# Patient Record
Sex: FEMALE | Race: WHITE | HISPANIC OR LATINO | URBAN - METROPOLITAN AREA
[De-identification: names, ages, dates, MRNs, and addresses within clinical notes are randomized per-mention and may not be internally consistent; named-entity substitution may affect disease eponyms.]

---

## 2022-01-28 PROCEDURE — U0004 COV-19 TEST NON-CDC HGH THRU: HCPCS | Performed by: NURSE PRACTITIONER

## 2023-07-29 DIAGNOSIS — F32.2 SEVERE DEPRESSION: ICD-10-CM

## 2023-07-29 DIAGNOSIS — Z72.0 TOBACCO ABUSE: ICD-10-CM

## 2023-07-31 RX ORDER — BUPROPION HYDROCHLORIDE 100 MG/1
TABLET, EXTENDED RELEASE ORAL
Qty: 60 TABLET | Refills: 3 | Status: SHIPPED | OUTPATIENT
Start: 2023-07-31

## 2023-08-28 DIAGNOSIS — L70.9 ACNE, UNSPECIFIED ACNE TYPE: ICD-10-CM

## 2023-08-28 RX ORDER — DOXYCYCLINE HYCLATE 50 MG/1
50 CAPSULE ORAL 2 TIMES DAILY
Qty: 60 CAPSULE | Refills: 0 | OUTPATIENT
Start: 2023-08-28

## 2023-08-29 RX ORDER — DOXYCYCLINE HYCLATE 50 MG/1
50 CAPSULE ORAL 2 TIMES DAILY
Qty: 60 CAPSULE | Refills: 0 | Status: SHIPPED | OUTPATIENT
Start: 2023-08-29

## 2023-08-29 NOTE — TELEPHONE ENCOUNTER
Caller: Jaskaran Dgina    Relationship: Self    Best call back number: [phone]    Requested Prescriptions:   Requested Prescriptions     Refused Prescriptions Disp Refills    doxycycline (VIBRAMYCIN) 50 MG capsule 60 capsule 0     Sig: Take 1 capsule by mouth 2 (Two) Times a Day.     Refused By: RICARDO JAMES     Reason for Refusal: Patient needs an appointment        Pharmacy where request should be sent: [facility] [id number] [address] [phone] PH - [phone]      Last office visit with prescribing clinician: 7/3/2023   Last telemedicine visit with prescribing clinician: Visit date not found   Next office visit with prescribing clinician: 8/28/2023     Additional details provided by patient: PATIENT IS OUT OF THE MEDICATION     Does the patient have less than a 3 day supply:  [x] Yes  [] No    Would you like a call back once the refill request has been completed: [x] Yes [] No    If the office needs to give you a call back, can they leave a voicemail: [x] Yes [] No    Jordana Salmon Rep   08/29/23 12:49 EDT

## 2023-09-19 ENCOUNTER — OFFICE VISIT (OUTPATIENT)
Dept: INTERNAL MEDICINE | Facility: CLINIC | Age: 33
End: 2023-09-19
Payer: MEDICAID

## 2023-09-19 VITALS
HEART RATE: 72 BPM | HEIGHT: 67 IN | BODY MASS INDEX: 43.98 KG/M2 | WEIGHT: 280.2 LBS | DIASTOLIC BLOOD PRESSURE: 88 MMHG | SYSTOLIC BLOOD PRESSURE: 118 MMHG | TEMPERATURE: 97.8 F | RESPIRATION RATE: 18 BRPM

## 2023-09-19 DIAGNOSIS — Z00.00 ENCOUNTER FOR WELLNESS EXAMINATION: Primary | ICD-10-CM

## 2023-09-19 DIAGNOSIS — J45.21 MILD INTERMITTENT ASTHMA WITH ACUTE EXACERBATION: ICD-10-CM

## 2023-09-19 DIAGNOSIS — Z13.6 ENCOUNTER FOR LIPID SCREENING FOR CARDIOVASCULAR DISEASE: ICD-10-CM

## 2023-09-19 DIAGNOSIS — F43.10 PTSD (POST-TRAUMATIC STRESS DISORDER): ICD-10-CM

## 2023-09-19 DIAGNOSIS — Z72.53 HIGH RISK BISEXUAL BEHAVIOR: ICD-10-CM

## 2023-09-19 DIAGNOSIS — N92.1 MENORRHAGIA WITH IRREGULAR CYCLE: ICD-10-CM

## 2023-09-19 DIAGNOSIS — Z13.220 ENCOUNTER FOR LIPID SCREENING FOR CARDIOVASCULAR DISEASE: ICD-10-CM

## 2023-09-19 DIAGNOSIS — F60.3 BORDERLINE PERSONALITY DISORDER: ICD-10-CM

## 2023-09-19 DIAGNOSIS — Z23 ENCOUNTER FOR IMMUNIZATION: ICD-10-CM

## 2023-09-19 DIAGNOSIS — F33.2 SEVERE EPISODE OF RECURRENT MAJOR DEPRESSIVE DISORDER, WITHOUT PSYCHOTIC FEATURES: ICD-10-CM

## 2023-09-19 DIAGNOSIS — L70.9 ACNE, UNSPECIFIED ACNE TYPE: ICD-10-CM

## 2023-09-19 DIAGNOSIS — L40.9 PSORIASIS: ICD-10-CM

## 2023-09-19 RX ORDER — DOXYCYCLINE HYCLATE 50 MG/1
50 CAPSULE ORAL DAILY
Qty: 30 CAPSULE | Refills: 0 | Status: SHIPPED | OUTPATIENT
Start: 2023-09-19

## 2023-09-19 RX ORDER — ALBUTEROL SULFATE 90 UG/1
2 AEROSOL, METERED RESPIRATORY (INHALATION) EVERY 4 HOURS PRN
Qty: 18 G | Refills: 0 | Status: SHIPPED | OUTPATIENT
Start: 2023-09-19

## 2023-09-19 RX ORDER — TRIAMCINOLONE ACETONIDE 1 MG/G
1 CREAM TOPICAL 2 TIMES DAILY
Qty: 15 G | Refills: 1 | Status: SHIPPED | OUTPATIENT
Start: 2023-09-19

## 2023-09-19 NOTE — LETTER
[facility]  Vaccine Consent Form    Patient Name:  Digna Jessica  Patient :  1990     Vaccine(s) Ordered    Tdap Vaccine Greater Than or Equal To 8yo IM        Screening Checklist  The following questions should be completed prior to vaccination. If you answer “yes” to any question, it does not necessarily mean you should not be vaccinated. It just means we may need to clarify or ask more questions. If a question is unclear, please ask your healthcare provider to explain it.    Yes No   Any fever or moderate to severe illness today (mild illness and/or antibiotic treatment are not contraindications)?     Do you have a history of a serious reaction to any previous vaccinations, such as anaphylaxis, encephalopathy within 7 days, Guillain-Eldridge syndrome within 6 weeks, seizure?     Have you received any live vaccine(s) in the past month (MMR, YASMINE)?     Do you have an anaphylactic allergy to latex (DTaP, DTaP-IPV, Hep A, Hep B, MenB, RV, Td, Tdap), baker’s yeast (Hep B, HPV), or gelatin (YASMINE, MMR)?     Do you have an anaphylactic allergy to neomycin (Rabies, YASMINE, MMR, IPV, Hep A), polymyxin B (IPV), or streptomycin (IPV)?      Any cancer, leukemia, AIDS, or other immune system disorder? (YASMINE, MMR, RV)     Do you have a parent, brother, or sister with an immune system problem (if immune competence of vaccine recipient clinically verified, can proceed)? (MMR, YASMINE)     Any recent steroid treatments for >2 weeks, chemotherapy, or radiation treatment? (YASMINE, MMR)     Have you received antibody-containing blood transfusions or IVIG in the past 11 months (recommended interval is dependent on product)? (MMR, YASMINE)     Have you taken antiviral drugs (acyclovir, famciclovir, valacyclovir) in the last 24 or 48 hours, respectively (YASMINE)?      Are you pregnant or planning to become pregnant within 1 month? (YASMINE, MMR, HPV, IPV, MenB; For hep B- refer to Engerix-B)     For infants, have you ever been told your child has had  intussusception or a medical emergency involving obstruction of the intestine (RV)? If not for an infant, can skip this question.         *Ordering Physician/APC should be consulted if “yes” is checked by the patient or guardian above.      I have received, read, and understand the Vaccine Information Statement (VIS) for each vaccine ordered above.  I have considered my health status as well as the health status of my close contacts.  I have taken the opportunity to discuss my vaccine questions with my health care provider.   I have requested that the ordered vaccine(s) be given to me.  I understand the benefits and risks of the vaccines.  I understand that I should remain in the clinic for 15 minutes after receiving the vaccine(s).  _________________________________________________________  Signature of Patient or Parent/Legal Guardian ____________________  Date

## 2023-09-19 NOTE — PATIENT INSTRUCTIONS
MyPlate from USDA  MyPlate is an outline of a general healthy diet based on the Dietary Guidelines for Americans, 7106-5263, from the U.[place]. Department of Agriculture (USDA). It sets guidelines for how much food you should eat from each food group based on your age, sex, and level of physical activity.  What are tips for following MyPlate?  To follow MyPlate recommendations:  Eat a wide variety of fruits and vegetables, grains, and protein foods.  Serve smaller portions and eat less food throughout the day.  Limit portion sizes to avoid overeating.  Enjoy your food.  Get at least 150 minutes of exercise every week. This is about 30 minutes each day, 5 or more days per week.  It can be difficult to have every meal look like MyPlate. Think about MyPlate as eating guidelines for an entire day, rather than each individual meal.  Fruits and vegetables  Make one half of your plate fruits and vegetables.  Eat many different colors of fruits and vegetables each day.  For a 2,000-calorie daily food plan, eat:  2½ cups of vegetables every day.  2 cups of fruit every day.  1 cup is equal to:  1 cup raw or cooked vegetables.  1 cup raw fruit.  1 medium-sized orange, apple, or banana.  1 cup 100% fruit or vegetable juice.  2 cups raw leafy greens, such as lettuce, spinach, or kale.  ½ cup dried fruit.  Grains  One fourth of your plate should be grains.  Make at least half of the grains you eat each day whole grains.  For a 2,000-calorie daily food plan, eat 6 oz of grains every day.  1 oz is equal to:  1 slice bread.  1 cup cereal.  ½ cup cooked rice, cereal, or pasta.  Protein  One fourth of your plate should be protein.  Eat a wide variety of protein foods, including meat, poultry, fish, eggs, beans, nuts, and tofu.  For a 2,000-calorie daily food plan, eat 5½ oz of protein every day.  1 oz is equal to:  1 oz meat, poultry, or fish.  ¼ cup cooked beans.  1 egg.  ½ oz nuts or seeds.  1 Tbsp peanut butter.  Dairy  Drink fat-free  or low-fat (1%) milk.  Eat or drink dairy as a side to meals.  For a 2,000-calorie daily food plan, eat or drink 3 cups of dairy every day.  1 cup is equal to:  1 cup milk, yogurt, cottage cheese, or soy milk (soy beverage).  2 oz processed cheese.  1½ oz natural cheese.  Fats, oils, salt, and sugars  Only small amounts of oils are recommended.  Avoid foods that are high in calories and low in nutritional value (empty calories), like foods high in fat or added sugars.  Choose foods that are low in salt (sodium). Choose foods that have less than 140 milligrams (mg) of sodium per serving.  Drink water instead of sugary drinks. Drink enough fluid to keep your urine pale yellow.  Where to find support  Work with your health care provider or a dietitian to develop a customized eating plan that is right for you.  Download an david (mobile application) to help you track your daily food intake.  Where to find more information  USDA: ChooseMyPlate.gov  Summary  MyPlate is a general guideline for healthy eating from the USDA. It is based on the Dietary Guidelines for Americans, 3403-9493.  In general, fruits and vegetables should take up one half of your plate, grains should take up one fourth of your plate, and protein should take up one fourth of your plate.  This information is not intended to replace advice given to you by your health care provider. Make sure you discuss any questions you have with your health care provider.  Document Revised: 11/08/2021 Document Reviewed: 11/08/2021  [employer] Patient Education © 2022 [employer]

## 2023-09-19 NOTE — PROGRESS NOTES
Female Physical Note      Patient Name: Digna Jessica  : 1990   MRN: [id number]     Chief Complaint:    Chief Complaint   Patient presents with    Annual Exam     And PAP       History of Present Illness: Digna Jessica is a 32 y.o. adult who is here today for their annual health maintenance and physical.    The patient presents today for an annual physical examination. They are accompanied by a friend, Christina Nieves.    At the patient's last visit, their menstrual cycle was discussed. The patient denies following up with any other providers currently. They are interested in referral to gynecology for evaluation and Pap smear. The patient denies dysuria, vaginal discharge, and change in bowel habits. They note menorrhagia and dysmenorrhea. Their menses has been irregular recently though was regular prior to 2023. They had menses in early 2023 and late 2023 and none since. The patient has been informed they may have endometriosis though has not yet undergone ultrasound. They do not report a diagnosis of polycystic ovary syndrome. They inquire about breast examination and deny current breast issues.    The patient sometimes feels that Wellbutrin 100 mg twice daily is not adequate to treat depression. Additionally, they deny noticing any weight loss benefit with Wellbutrin. In youth, they tried Abilify and Lexapro. As an adult, they tried Wellbutrin and Celexa. The patient's depression is severe and recurrent. They deny being diagnosed with bipolar disorder. They report being diagnosed with borderline personality disorder and posttraumatic stress disorder.    The patient has psoriasis  which affects their bilateral elbows, bilateral knees, scalp, and occasionally bilateral ears. They recall undergoing ear irrigation in childhood. They are utilizing triamcinolone ointment; however, they prefer triamcinolone cream. The patient was previously referred to [facility] in [place] and believes their  "appointment has been scheduled. Chart review indicates [facility] may have encountered difficulty contacting the patient.    They request sexually-transmitted infection testing, including blood work, which the patient has not undergone in a lengthy period of time. The patient reports male and female sexual partners.    The patient experiences frequent headaches and denies trialing prescription medication for headaches. They have tried naproxen and ibuprofen without relief.     They competed the previously prescribed doxycycline twice daily, which was significantly improved acne. They are currently \"breaking out\" (redness) due to sun exposure.    The patient notes mild fatigue.    The patient is fasting today and is uncertain if their vitamin D level has ever been low.     They smoke and are interested in tobacco cessation. Wellbutrin was helpful initially, and they have reduced their smoking. They decline nicotine patches.    The patient has a history of asthma, which is occasionally exacerbated by seasonal changes. They utilize an albuterol inhaler.    They exercise at home with an david and utilize weights and a yoga ball.    The patient is due for pneumonia and tetanus vaccinations.    They recently tried to donate plasma, psoriasis was noticed, and they were provided with paperwork that must be completed/faxed by primary care regarding medications and doses.    Subjective     Review of System: Review of Systems   Constitutional:  Positive for fatigue.   Skin:         Acne; psoriasis     Psychiatric/Behavioral:  Positive for dysphoric mood.     I have reviewed the ROS documented by my clinical staff, updated appropriately and I agree. [facility] Alfredo      Past Medical History:   Past Medical History:   Diagnosis Date    ADHD (attention deficit hyperactivity disorder) 1990    Allergic 1990    Anxiety 2010    Arthritis     Asthma 1990    Depression     Obesity 2010       Past Surgical History:   Past " Surgical History:   Procedure Laterality Date    CHOLECYSTECTOMY      CYST REMOVAL         Family History:   Family History   Problem Relation Age of Onset    Anxiety disorder Mother     Depression Mother     Diabetes Mother     Heart disease Mother     Mental illness Mother     Anxiety disorder Brother        Social History:   Social History     Socioeconomic History    Marital status: Single   Tobacco Use    Smoking status: Every Day     Packs/day: 0.10     Years: 10.00     Pack years: 1.00     Types: Cigarettes    Smokeless tobacco: Former   Vaping Use    Vaping Use: Never used   Substance and Sexual Activity    Alcohol use: Yes     Alcohol/week: 9.0 standard drinks     Types: 3 Cans of beer, 3 Shots of liquor, 3 Drinks containing 0.5 oz of alcohol per week    Drug use: No    Sexual activity: Yes     Partners: Female, Male     Birth control/protection: Same-sex partner       Medications:     Current Outpatient Medications:     buPROPion SR (WELLBUTRIN SR) 100 MG 12 hr tablet, TAKE 1 TABLET BY MOUTH TWICE A DAY, Disp: 60 tablet, Rfl: 3    albuterol sulfate  (90 Base) MCG/ACT inhaler, Inhale 2 puffs Every 4 (Four) Hours As Needed for Wheezing., Disp: 18 g, Rfl: 0    doxycycline (VIBRAMYCIN) 50 MG capsule, Take 1 capsule by mouth Daily., Disp: 30 capsule, Rfl: 0    triamcinolone (KENALOG) 0.1 % cream, Apply 1 application  topically to the appropriate area as directed 2 (Two) Times a Day., Disp: 15 g, Rfl: 1    Allergies:   Allergies   Allergen Reactions    Codeine GI Intolerance    Sulfa Antibiotics GI Intolerance       Immunizations:  “Discussed risks/benefits to vaccination, reviewed components of the vaccine, discussed VIS, discussed informed consent, informed consent obtained. Patient/Parent was allowed to accept or refuse vaccine. Questions answered to satisfactory state of patient/Parent. We reviewed typical age appropriate and seasonally appropriate vaccinations. Reviewed immunization history and  "updated state vaccination form as needed. Patient was counseled on COVID-19  Prevnar 20  Tdap   Hep C: Screen per guidelines     Colorectal Screening:     Last Completed Colonoscopy       This patient has no relevant Health Maintenance data.          Pap:    Last Completed Pap Smear       This patient has no relevant Health Maintenance data.          Mammogram:    Last Completed Mammogram       This patient has no relevant Health Maintenance data.             Objective     Physical Exam:  Vital Signs:   Vitals:    09/19/23 1123   BP: 118/88   BP Location: Right arm   Patient Position: Sitting   Cuff Size: Large Adult   Pulse: 72   Resp: 18   Temp: 97.8 °F (36.6 °C)   TempSrc: Infrared   Weight: 127 kg (280 lb 3.2 oz)   Height: 168.9 cm (66.5\")   PainSc: 0-No pain     Body mass index is 44.55 kg/m².        Physical Exam  Vitals and nursing note reviewed.   Constitutional:       General: Digna Jessica is not in acute distress.     Appearance: Normal appearance. Digna Jessica is not ill-appearing.   HENT:      Head: Normocephalic.      Right Ear: Tympanic membrane, ear canal and external ear normal. There is no impacted cerumen.      Left Ear: Tympanic membrane, ear canal and external ear normal. There is no impacted cerumen.      Nose: No nasal tenderness or rhinorrhea.      Mouth/Throat:      Mouth: Mucous membranes are moist.      Pharynx: Oropharynx is clear. No oropharyngeal exudate or posterior oropharyngeal erythema.   Eyes:      General:         Right eye: No discharge.         Left eye: No discharge.      Extraocular Movements: Extraocular movements intact.      Conjunctiva/sclera: Conjunctivae normal.      Pupils: Pupils are equal, round, and reactive to light.   Neck:      Thyroid: No thyromegaly.   Cardiovascular:      Rate and Rhythm: Normal rate and regular rhythm.      Pulses: Normal pulses.      Heart sounds: Normal heart sounds. No murmur heard.    No gallop.   Pulmonary:      Effort: Pulmonary effort is " normal.      Breath sounds: Normal breath sounds. No wheezing, rhonchi or rales.   Abdominal:      General: Bowel sounds are normal.      Palpations: Abdomen is soft. There is no mass.      Tenderness: There is no abdominal tenderness. There is no right CVA tenderness or left CVA tenderness.   Musculoskeletal:         General: No swelling or tenderness. Normal range of motion.      Cervical back: Normal range of motion.      Right lower leg: No edema.      Left lower leg: No edema.   Lymphadenopathy:      Cervical: No cervical adenopathy.   Skin:     General: Skin is warm and dry.      Capillary Refill: Capillary refill takes less than 2 seconds.      Findings: No erythema or rash.      Comments: Acne improved; psoriasis on elbows    Neurological:      General: No focal deficit present.      Mental Status: Digna Jessica is alert and oriented to person, place, and time.      Motor: No weakness.   Psychiatric:         Mood and Affect: Mood normal.         Behavior: Behavior is cooperative.         Cognition and Memory: Digna Jessica does not exhibit impaired recent memory.       Procedures    Assessment / Plan      Assessment/Plan:   Diagnoses and all orders for this visit:    1. Encounter for wellness examination (Primary)  -     Comprehensive Metabolic Panel; Future  -     CBC & Differential; Future  -     TSH Rfx On Abnormal To Free T4; Future  -     CBC & Differential  -     Comprehensive Metabolic Panel  -     TSH Rfx On Abnormal To Free T4    2. Severe episode of recurrent major depressive disorder, without psychotic features  -     GeneSight - Swab,; Future    3. Borderline personality disorder  -     GeneSight - Swab,; Future    4. PTSD (post-traumatic stress disorder)  -     GeneSight - Swab,; Future    5. High risk bisexual behavior  -     Hepatitis B Core Antibody, Total; Future  -     Hepatitis B Core Antibody, IgM; Future  -     HIV-1 / O / 2 Ag / Antibody; Future  -     HSV 1 & 2 - Specific Antibody, IgG;  Future  -     RPR; Future  -     Hepatitis C Antibody; Future  -     Cancel: Hepatitis B Surface Antigen; Future  -     Hepatitis B Surface Antibody; Future  -     Hepatitis A Antibody, IgM; Future  -     Cancel: Chlamydia trachomatis, Neisseria gonorrhoeae, Trichomonas vaginalis, PCR - Urine, Urine, Clean Catch; Future  -     Hepatitis B Surface Antigen; Future  -     Hepatitis B Surface Antigen  -     Hepatitis A Antibody, IgM  -     Hepatitis B Surface Antibody  -     Hepatitis C Antibody  -     RPR  -     HSV 1 & 2 - Specific Antibody, IgG  -     HIV-1 / O / 2 Ag / Antibody  -     Hepatitis B Core Antibody, IgM  -     Hepatitis B Core Antibody, Total  -     Cancel: Chlamydia trachomatis, Neisseria gonorrhoeae, Trichomonas vaginalis, PCR - Urine, Urine, Clean Catch  -     Chlamydia trachomatis, Neisseria gonorrhoeae, Trichomonas vaginalis, PCR - Swab, Urine, Clean Catch; Future  -     Chlamydia trachomatis, Neisseria gonorrhoeae, Trichomonas vaginalis, PCR - Urine, Urine, Clean Catch    6. Encounter for lipid screening for cardiovascular disease  -     Lipid Panel; Future  -     Lipid Panel    7. Acne, unspecified acne type  -     doxycycline (VIBRAMYCIN) 50 MG capsule; Take 1 capsule by mouth Daily.  Dispense: 30 capsule; Refill: 0    8. Psoriasis  -     triamcinolone (KENALOG) 0.1 % cream; Apply 1 application  topically to the appropriate area as directed 2 (Two) Times a Day.  Dispense: 15 g; Refill: 1    9. Mild intermittent asthma with acute exacerbation  -     albuterol sulfate  (90 Base) MCG/ACT inhaler; Inhale 2 puffs Every 4 (Four) Hours As Needed for Wheezing.  Dispense: 18 g; Refill: 0    10. Menorrhagia with irregular cycle  -     Ambulatory Referral to Gynecology    11. Encounter for immunization  -     Tdap Vaccine Greater Than or Equal To 6yo IM    1. Annual physical examination  - A panel of laboratory studies will be performed including CMP, CBC, lipid panel, and thyroid function.  -  Sexually-transmitted infection urine and blood testing will be performed for human immunodeficiency virus, syphilis, hepatitis B, chlamydia, gonorrhea, and  herpes simplex virus. They will be contacted with their results.  - Tetanus vaccination will be administered today. Pneumonia vaccination will be administered at next followup.  - Paperwork for plasma donation was completed and will be faxed.    2. Menorrhagia, dysmenorrhea, and irregular menses  - Referral was placed to [facility] for evaluation, Pap smear, and breast examination. Scheduling phone number was provided.  Scheduling will attempt three calls; if unable to reach patient, the orders will be canceled.      3. Severe recurrent depression  - GeneSight testing was ordered, and results are expected in approximately 2 weeks. They will contact the office to schedule a telehealth appointment if they wish to change medication or increase Wellbutrin. Wellbutrin 100 mg twice daily will be continued for the time being.     4. Borderline personality disorder  -Consider referral to .     5. Posttraumatic stress disorder    6. Psoriasis  - Triamcinolone cream was prescribed in place of ointment.  - They will contact [facility] to verify if an appointment is scheduled. If re-referral to dermatology is needed, they will contact the office.     7. Headaches  - Prescription medication for headaches and weight loss was discussed and may be considered in the future after depression control improves. Additional information was provided via [employer].     8. Acne  - Doxycycline is being decreased to once daily for 1 month. They were informed that doxycycline may cause skin hypersensitivity to sunlight and advised to hold doxycycline until current sunburn resolves. Daily sunscreen was recommended. They were recommended to avoid pregnancy while taking doxycycline. The patient was warned that doxycycline may interact with some over-the-counter  vitamins and advised to take these 2 hours apart from doxycycline.  - They will follow up in 1 month for reevaluation of acne and consideration of further doxycycline treatment.    9. Asthma  - Albuterol inhaler was refilled.    10. Tobacco use  - Prescription medications for tobacco cessation may adversely affect mood. This may be revisited after depression control improves.      Follow Up:   Return in about 4 weeks (around 10/17/2023) for Recheck.    Healthcare Maintenance:   Counseling provided on     Health Maintenance, Female  Adopting a healthy lifestyle and getting preventive care can go a long way to promote health and wellness. Talk with your health care provider about what schedule of regular examinations is right for you. This is a good chance for you to check in with your provider about disease prevention and staying healthy.  In between checkups, there are plenty of things you can do on your own. Experts have done a lot of research about which lifestyle changes and preventive measures are most likely to keep you healthy. Ask your health care provider for more information.  Weight and diet  Eat a healthy diet  Be sure to include plenty of vegetables, fruits, low-fat dairy products, and lean protein.  Do not eat a lot of foods high in solid fats, added sugars, or salt.  Get regular exercise. This is one of the most important things you can do for your health.  Most adults should exercise for at least 150 minutes each week. The exercise should increase your heart rate and make you sweat (moderate-intensity exercise).  Most adults should also do strengthening exercises at least twice a week. This is in addition to the moderate-intensity exercise.     Maintain a healthy weight  Body mass index (BMI) is a measurement that can be used to identify possible weight problems. It estimates body fat based on height and weight. Your health care provider can help determine your BMI and help you achieve or maintain a  healthy weight.  For females 20 years of age and older:  A BMI below 18.5 is considered underweight.  A BMI of 18.5 to 24.9 is normal.  A BMI of 25 to 29.9 is considered overweight.  A BMI of 30 and above is considered obese.     Watch levels of cholesterol and blood lipids  You should start having your blood tested for lipids and cholesterol at 20 years of age, then have this test every 5 years.  You may need to have your cholesterol levels checked more often if:  Your lipid or cholesterol levels are high.  You are older than 50 years of age.  You are at high risk for heart disease.     Cancer screening  Lung Cancer  Lung cancer screening is recommended for adults 55-80 years old who are at high risk for lung cancer because of a history of smoking.  A yearly low-dose CT scan of the lungs is recommended for people who:  Currently smoke.  Have quit within the past 15 years.  Have at least a 30-pack-year history of smoking. A pack year is smoking an average of one pack of cigarettes a day for 1 year.  Yearly screening should continue until it has been 15 years since you quit.  Yearly screening should stop if you develop a health problem that would prevent you from having lung cancer treatment.     Breast Cancer  Practice breast self-awareness. This means understanding how your breasts normally appear and feel.  It also means doing regular breast self-exams. Let your health care provider know about any changes, no matter how small.  If you are in your 20s or 30s, you should have a clinical breast exam (CBE) by a health care provider every 1-3 years as part of a regular health exam.  If you are 40 or older, have a CBE every year. Also consider having a breast X-ray (mammogram) every year.  If you have a family history of breast cancer, talk to your health care provider about genetic screening.  If you are at high risk for breast cancer, talk to your health care provider about having an MRI and a mammogram every  year.  Breast cancer gene (BRCA) assessment is recommended for women who have family members with BRCA-related cancers. BRCA-related cancers include:  Breast.  Ovarian.  Tubal.  Peritoneal cancers.  Results of the assessment will determine the need for genetic counseling and BRCA1 and BRCA2 testing.     Cervical Cancer  Your health care provider may recommend that you be screened regularly for cancer of the pelvic organs (ovaries, uterus, and vagina). This screening involves a pelvic examination, including checking for microscopic changes to the surface of your cervix (Pap test). You may be encouraged to have this screening done every 3 years, beginning at age 21.  For women ages 30-65, health care providers may recommend pelvic exams and Pap testing every 3 years, or they may recommend the Pap and pelvic exam, combined with testing for human papilloma virus (HPV), every 5 years. Some types of HPV increase your risk of cervical cancer. Testing for HPV may also be done on women of any age with unclear Pap test results.  Other health care providers may not recommend any screening for nonpregnant women who are considered low risk for pelvic cancer and who do not have symptoms. Ask your health care provider if a screening pelvic exam is right for you.  If you have had past treatment for cervical cancer or a condition that could lead to cancer, you need Pap tests and screening for cancer for at least 20 years after your treatment. If Pap tests have been discontinued, your risk factors (such as having a new sexual partner) need to be reassessed to determine if screening should resume. Some women have medical problems that increase the chance of getting cervical cancer. In these cases, your health care provider may recommend more frequent screening and Pap tests.     Colorectal Cancer  This type of cancer can be detected and often prevented.  Routine colorectal cancer screening usually begins at 50 years of age and  continues through 75 years of age.  Your health care provider may recommend screening at an earlier age if you have risk factors for colon cancer.  Your health care provider may also recommend using home test kits to check for hidden blood in the stool.  A small camera at the end of a tube can be used to examine your colon directly (sigmoidoscopy or colonoscopy). This is done to check for the earliest forms of colorectal cancer.  Routine screening usually begins at age 50.  Direct examination of the colon should be repeated every 5-10 years through 75 years of age. However, you may need to be screened more often if early forms of precancerous polyps or small growths are found.     Skin Cancer  Check your skin from head to toe regularly.  Tell your health care provider about any new moles or changes in moles, especially if there is a change in a mole's shape or color.  Also tell your health care provider if you have a mole that is larger than the size of a pencil eraser.  Always use sunscreen. Apply sunscreen liberally and repeatedly throughout the day.  Protect yourself by wearing long sleeves, pants, a wide-brimmed hat, and sunglasses whenever you are outside.     Heart disease, diabetes, and high blood pressure  High blood pressure causes heart disease and increases the risk of stroke. High blood pressure is more likely to develop in:  People who have blood pressure in the high end of the normal range (130-139/85-89 mm Hg).  People who are overweight or obese.  People who are .  If you are 18-39 years of age, have your blood pressure checked every 3-5 years. If you are 40 years of age or older, have your blood pressure checked every year. You should have your blood pressure measured twice--once when you are at a hospital or clinic, and once when you are not at a hospital or clinic. Record the average of the two measurements. To check your blood pressure when you are not at a hospital or clinic, you  can use:  An automated blood pressure machine at a pharmacy.  A home blood pressure monitor.  If you are between 55 years and 79 years old, ask your health care provider if you should take aspirin to prevent strokes.  Have regular diabetes screenings. This involves taking a blood sample to check your fasting blood sugar level.  If you are at a normal weight and have a low risk for diabetes, have this test once every three years after 45 years of age.  If you are overweight and have a high risk for diabetes, consider being tested at a younger age or more often.  Preventing infection  Hepatitis B  If you have a higher risk for hepatitis B, you should be screened for this virus. You are considered at high risk for hepatitis B if:  You were born in a country where hepatitis B is common. Ask your health care provider which countries are considered high risk.  Your parents were born in a high-risk country, and you have not been immunized against hepatitis B (hepatitis B vaccine).  You have HIV or AIDS.  You use needles to inject street drugs.  You live with someone who has hepatitis B.  You have had sex with someone who has hepatitis B.  You get hemodialysis treatment.  You take certain medicines for conditions, including cancer, organ transplantation, and autoimmune conditions.     Hepatitis C  Blood testing is recommended for:  Everyone born from 1945 through 1965.  Anyone with known risk factors for hepatitis C.     Sexually transmitted infections (STIs)  You should be screened for sexually transmitted infections (STIs) including gonorrhea and chlamydia if:  You are sexually active and are younger than 24 years of age.  You are older than 24 years of age and your health care provider tells you that you are at risk for this type of infection.  Your sexual activity has changed since you were last screened and you are at an increased risk for chlamydia or gonorrhea. Ask your health care provider if you are at risk.  If  you do not have HIV, but are at risk, it may be recommended that you take a prescription medicine daily to prevent HIV infection. This is called pre-exposure prophylaxis (PrEP). You are considered at risk if:  You are sexually active and do not regularly use condoms or know the HIV status of your partner(s).  You take drugs by injection.  You are sexually active with a partner who has HIV.     Talk with your health care provider about whether you are at high risk of being infected with HIV. If you choose to begin PrEP, you should first be tested for HIV. You should then be tested every 3 months for as long as you are taking PrEP.  Pregnancy  If you are premenopausal and you may become pregnant, ask your health care provider about preconception counseling.  If you may become pregnant, take 400 to 800 micrograms (mcg) of folic acid every day.  If you want to prevent pregnancy, talk to your health care provider about birth control (contraception).  Osteoporosis and menopause  Osteoporosis is a disease in which the bones lose minerals and strength with aging. This can result in serious bone fractures. Your risk for osteoporosis can be identified using a bone density scan.  If you are 65 years of age or older, or if you are at risk for osteoporosis and fractures, ask your health care provider if you should be screened.  Ask your health care provider whether you should take a calcium or vitamin D supplement to lower your risk for osteoporosis.  Menopause may have certain physical symptoms and risks.  Hormone replacement therapy may reduce some of these symptoms and risks.  Talk to your health care provider about whether hormone replacement therapy is right for you.  Follow these instructions at home:  Schedule regular health, dental, and eye exams.  Stay current with your immunizations.  Do not use any tobacco products including cigarettes, chewing tobacco, or electronic cigarettes.  If you are pregnant, do not drink  alcohol.  If you are breastfeeding, limit how much and how often you drink alcohol.  Limit alcohol intake to no more than 1 drink per day for nonpregnant women. One drink equals 12 ounces of beer, 5 ounces of wine, or 1½ ounces of hard liquor.  Do not use street drugs.  Do not share needles.  Ask your health care provider for help if you need support or information about quitting drugs.  Tell your health care provider if you often feel depressed.  Tell your health care provider if you have ever been abused or do not feel safe at home.  This information is not intended to replace advice given to you by your health care provider. Make sure you discuss any questions you have with your health care provider.  Document Released: 07/02/2012 Document Revised: 05/25/2017 Document Reviewed: 09/20/2016  [employer] Interactive Patient Education © 2018 [employer]. Digna Jessica voices understanding and acceptance of this advice and will call back with any further questions or concerns. AVS with preventive healthcare tips printed for patient.     ISHAN Alfredo  [facility]     Transcribed from ambient dictation for ISHAN Alfredo by Geri Alexander.  09/19/23   16:02 EDT    Patient or patient representative verbalized consent to the visit recording.  I have personally performed the services described in this document as transcribed by the above individual, and it is both accurate and complete.

## 2023-09-20 LAB
ALBUMIN SERPL-MCNC: 4 G/DL (ref 3.9–4.9)
ALBUMIN/GLOB SERPL: 1.4 {RATIO} (ref 1.2–2.2)
ALP SERPL-CCNC: 87 IU/L (ref 44–121)
ALT SERPL-CCNC: 13 IU/L (ref 0–32)
AST SERPL-CCNC: 13 IU/L (ref 0–40)
BASOPHILS # BLD AUTO: 0 X10E3/UL (ref 0–0.2)
BASOPHILS NFR BLD AUTO: 0 %
BILIRUB SERPL-MCNC: <0.2 MG/DL (ref 0–1.2)
BUN SERPL-MCNC: 12 MG/DL (ref 6–20)
BUN/CREAT SERPL: 15 (ref 9–23)
CALCIUM SERPL-MCNC: 9 MG/DL (ref 8.7–10.2)
CHLORIDE SERPL-SCNC: 102 MMOL/L (ref 96–106)
CHOLEST SERPL-MCNC: 171 MG/DL (ref 100–199)
CO2 SERPL-SCNC: 24 MMOL/L (ref 20–29)
CREAT SERPL-MCNC: 0.8 MG/DL (ref 0.57–1)
EGFRCR SERPLBLD CKD-EPI 2021: 100 ML/MIN/1.73
EOSINOPHIL # BLD AUTO: 0.3 X10E3/UL (ref 0–0.4)
EOSINOPHIL NFR BLD AUTO: 4 %
ERYTHROCYTE [DISTWIDTH] IN BLOOD BY AUTOMATED COUNT: 12.5 % (ref 11.7–15.4)
GLOBULIN SER CALC-MCNC: 2.8 G/DL (ref 1.5–4.5)
GLUCOSE SERPL-MCNC: 83 MG/DL (ref 70–99)
HAV IGM SERPL QL IA: NEGATIVE
HBV CORE AB SERPL QL IA: NEGATIVE
HBV CORE IGM SERPL QL IA: NEGATIVE
HBV SURFACE AB SER QL: REACTIVE
HBV SURFACE AG SERPL QL IA: NEGATIVE
HCT VFR BLD AUTO: 40.2 % (ref 34–46.6)
HCV IGG SERPL QL IA: NON REACTIVE
HDLC SERPL-MCNC: 38 MG/DL
HGB BLD-MCNC: 13.4 G/DL (ref 11.1–15.9)
HIV 1+2 AB+HIV1 P24 AG SERPL QL IA: NON REACTIVE
HSV1 IGG SER IA-ACNC: <0.91 INDEX (ref 0–0.9)
HSV2 IGG SER IA-ACNC: >23.6 INDEX (ref 0–0.9)
IMM GRANULOCYTES # BLD AUTO: 0 X10E3/UL (ref 0–0.1)
IMM GRANULOCYTES NFR BLD AUTO: 0 %
LDLC SERPL CALC-MCNC: 123 MG/DL (ref 0–99)
LYMPHOCYTES # BLD AUTO: 1.9 X10E3/UL (ref 0.7–3.1)
LYMPHOCYTES NFR BLD AUTO: 23 %
MCH RBC QN AUTO: 28.4 PG (ref 26.6–33)
MCHC RBC AUTO-ENTMCNC: 33.3 G/DL (ref 31.5–35.7)
MCV RBC AUTO: 85 FL (ref 79–97)
MONOCYTES # BLD AUTO: 0.5 X10E3/UL (ref 0.1–0.9)
MONOCYTES NFR BLD AUTO: 6 %
NEUTROPHILS # BLD AUTO: 5.5 X10E3/UL (ref 1.4–7)
NEUTROPHILS NFR BLD AUTO: 67 %
PLATELET # BLD AUTO: 319 X10E3/UL (ref 150–450)
POTASSIUM SERPL-SCNC: 4.4 MMOL/L (ref 3.5–5.2)
PROT SERPL-MCNC: 6.8 G/DL (ref 6–8.5)
RBC # BLD AUTO: 4.72 X10E6/UL (ref 3.77–5.28)
RPR SER QL: NON REACTIVE
SODIUM SERPL-SCNC: 137 MMOL/L (ref 134–144)
TRIGL SERPL-MCNC: 52 MG/DL (ref 0–149)
TSH SERPL DL<=0.005 MIU/L-ACNC: 0.7 UIU/ML (ref 0.45–4.5)
VLDLC SERPL CALC-MCNC: 10 MG/DL (ref 5–40)
WBC # BLD AUTO: 8.3 X10E3/UL (ref 3.4–10.8)

## 2023-09-21 LAB
C TRACH RRNA SPEC QL NAA+PROBE: NEGATIVE
N GONORRHOEA RRNA SPEC QL NAA+PROBE: NEGATIVE
T VAGINALIS RRNA SPEC QL NAA+PROBE: NEGATIVE

## 2023-09-25 ENCOUNTER — TELEPHONE (OUTPATIENT)
Dept: INTERNAL MEDICINE | Facility: CLINIC | Age: 33
End: 2023-09-25

## 2023-09-25 NOTE — TELEPHONE ENCOUNTER
Tried to reach patient no answer left voicemail to return call.     HUB OK TO READ:  Bad cholesterol is a little elevated.  Reduce fats saturated fats, increase exercise.  There is a history of herpes simplex 2.  This is commonly known as genital herpes but can also be seen with lesions around the mouth.  Syphilis, hepatitis C and HIV are negative.  Chlamydia, gonorrhea and trichomonas were negative.  There is immunity to hepatitis B.  Hepatitis A was negative.  The other test results show that your labs are in the normal range, stable, or mild abnormalities which do not require any further work-up at this time.  There is no diabetes, thyroid is normal and blood counts are normal.  We will continue to monitor labs over time.  Continue plan of care discussed at your appointment and follow-up if worsening or new concerns.

## 2023-09-25 NOTE — TELEPHONE ENCOUNTER
----- Message from ISHAN Alfredo sent at 9/24/2023 11:28 AM EDT -----  Bad cholesterol is a little elevated.  Reduce fats saturated fats, increase exercise.  There is a history of herpes simplex 2.  This is commonly known as genital herpes but can also be seen with lesions around the mouth.  Syphilis, hepatitis C and HIV are negative.  Chlamydia, gonorrhea and trichomonas were negative.  There is immunity to hepatitis B.  Hepatitis A was negative.  The other test results show that your labs are in the normal range, stable, or mild abnormalities which do not require any further work-up at this time.  There is no diabetes, thyroid is normal and blood counts are normal.  We will continue to monitor labs over time.  Continue plan of care discussed at your appointment and follow-up if worsening or new concerns.

## 2023-09-26 NOTE — TELEPHONE ENCOUNTER
Attempt #2 I left a message on the patients voicemail to call our office back, office number provided.       HUB OK TO READ:  Bad cholesterol is a little elevated.  Reduce fats saturated fats, increase exercise.  There is a history of herpes simplex 2.  This is commonly known as genital herpes but can also be seen with lesions around the mouth.  Syphilis, hepatitis C and HIV are negative.  Chlamydia, gonorrhea and trichomonas were negative.  There is immunity to hepatitis B.  Hepatitis A was negative.  The other test results show that your labs are in the normal range, stable, or mild abnormalities which do not require any further work-up at this time.  There is no diabetes, thyroid is normal and blood counts are normal.  We will continue to monitor labs over time.  Continue plan of care discussed at your appointment and follow-up if worsening or new concerns.

## 2023-09-27 DIAGNOSIS — F33.2 SEVERE EPISODE OF RECURRENT MAJOR DEPRESSIVE DISORDER, WITHOUT PSYCHOTIC FEATURES: ICD-10-CM

## 2023-09-27 DIAGNOSIS — F60.3 BORDERLINE PERSONALITY DISORDER: ICD-10-CM

## 2023-09-27 DIAGNOSIS — F43.10 PTSD (POST-TRAUMATIC STRESS DISORDER): ICD-10-CM

## 2023-09-27 NOTE — TELEPHONE ENCOUNTER
I left a message on the patients voicemail to call our office back, office number provided.     HUB:  Ask patient what additional questions she has.

## 2023-09-27 NOTE — TELEPHONE ENCOUNTER
3 unsuccessful attempts to reach patient, please advise    Attempt #3 I left a message on the patients voicemail to call our office back, office number provided.        HUB OK TO READ:  Bad cholesterol is a little elevated.  Reduce fats saturated fats, increase exercise.  There is a history of herpes simplex 2.  This is commonly known as genital herpes but can also be seen with lesions around the mouth.  Syphilis, hepatitis C and HIV are negative.  Chlamydia, gonorrhea and trichomonas were negative.  There is immunity to hepatitis B.  Hepatitis A was negative.  The other test results show that your labs are in the normal range, stable, or mild abnormalities which do not require any further work-up at this time.  There is no diabetes, thyroid is normal and blood counts are normal.  We will continue to monitor labs over time.  Continue plan of care discussed at your appointment and follow-up if worsening or new concerns

## 2023-09-27 NOTE — TELEPHONE ENCOUNTER
Name: JaskaranDigna  Relationship: Self  Best Callback Number: [phone]  HUB PROVIDED THE RELAY MESSAGE FROM THE OFFICE  PATIENT HAS FURTHER QUESTIONS AND WOULD LIKE A CALL BACK AT THE FOLLOWING PHONE NUMBER [phone]  ADDITIONAL INFORMATION:

## 2023-09-29 NOTE — TELEPHONE ENCOUNTER
Patient states that she has already found answers to her questions and no longer has any concerns.

## 2023-10-09 DIAGNOSIS — L40.9 PSORIASIS: ICD-10-CM

## 2023-10-09 DIAGNOSIS — L70.9 ACNE, UNSPECIFIED ACNE TYPE: ICD-10-CM

## 2023-10-09 RX ORDER — DOXYCYCLINE HYCLATE 50 MG/1
50 CAPSULE ORAL DAILY
Qty: 30 CAPSULE | Refills: 0 | Status: CANCELLED | OUTPATIENT
Start: 2023-10-09

## 2023-10-10 RX ORDER — TRIAMCINOLONE ACETONIDE 1 MG/G
1 CREAM TOPICAL 2 TIMES DAILY
Qty: 15 G | Refills: 1 | Status: SHIPPED | OUTPATIENT
Start: 2023-10-10

## 2023-10-10 NOTE — TELEPHONE ENCOUNTER
Last office visit (LOV) for chronic condition 09/19/2023  Next office visit (NOV)      Left message on machine for patient to call    Relay the following information:    Patient is due for a follow up.  Please advise patient she needs to schedule and keep her appointment to continue to receive refills in the future.  If she is unable to keep her appointment we will not be able to provider further refills.  Once appointment has been scheduled please update message with date and time so we can process the request.  We will forward the message to the provider to review the refill request.

## 2023-10-10 NOTE — TELEPHONE ENCOUNTER
Last office visit (LOV) for chronic condition 09/19/23  Next office visit (NOV) not scheduled yet

## 2023-10-12 NOTE — TELEPHONE ENCOUNTER
2nd attempt     Last office visit (LOV) for chronic condition 09/19/2023  Next office visit (NOV)       Left message on machine for patient to call     Relay the following information:     Patient is due for a follow up.  Please advise patient she needs to schedule and keep her appointment to continue to receive refills in the future.  If she is unable to keep her appointment we will not be able to provider further refills.  Once appointment has been scheduled please update message with date and time so we can process the request.  We will forward the message to the provider to review the refill request.

## 2023-11-20 ENCOUNTER — TELEMEDICINE (OUTPATIENT)
Dept: FAMILY MEDICINE CLINIC | Facility: TELEHEALTH | Age: 33
End: 2023-11-20
Payer: MEDICAID

## 2023-11-20 DIAGNOSIS — J06.9 VIRAL URI: ICD-10-CM

## 2023-11-20 DIAGNOSIS — B02.9 HERPES ZOSTER WITHOUT COMPLICATION: Primary | ICD-10-CM

## 2023-11-20 RX ORDER — DOXYCYCLINE HYCLATE 100 MG/1
100 CAPSULE ORAL DAILY
COMMUNITY
Start: 2023-10-16

## 2023-11-20 RX ORDER — BROMPHENIRAMINE MALEATE, PSEUDOEPHEDRINE HYDROCHLORIDE, AND DEXTROMETHORPHAN HYDROBROMIDE 2; 30; 10 MG/5ML; MG/5ML; MG/5ML
5 SYRUP ORAL 4 TIMES DAILY PRN
Qty: 118 ML | Refills: 0 | Status: SHIPPED | OUTPATIENT
Start: 2023-11-20

## 2023-11-20 RX ORDER — SPIRONOLACTONE 25 MG/1
25 TABLET ORAL DAILY
COMMUNITY
Start: 2023-09-29 | End: 2024-09-28

## 2023-11-20 RX ORDER — OMEPRAZOLE 40 MG/1
40 CAPSULE, DELAYED RELEASE ORAL DAILY
COMMUNITY
Start: 2023-09-29 | End: 2024-09-28

## 2023-11-20 RX ORDER — VALACYCLOVIR HYDROCHLORIDE 1 G/1
1000 TABLET, FILM COATED ORAL 2 TIMES DAILY
Qty: 14 TABLET | Refills: 0 | Status: SHIPPED | OUTPATIENT
Start: 2023-11-20 | End: 2023-11-27

## 2023-11-20 NOTE — PROGRESS NOTES
Subjective   Digna Jessica is a 33 y.o. adult.     History of Present Illness  She has a rash on her left hip that is painful. She noticed it yesterday. She has had similar symptoms before and was told it was shingles but it has been awhile since she was last diagnosed.        The following portions of the patient's history were reviewed and updated as appropriate: allergies, current medications, past family history, past medical history, past social history, past surgical history, and problem list.    Review of Systems   Constitutional:  Positive for fatigue. Negative for fever.   HENT:  Positive for congestion (2 days ago).    Respiratory:  Positive for cough (2 days ago).    Musculoskeletal:  Positive for myalgias (Left hip).   Skin:  Positive for color change and rash.       Objective   Physical Exam  Constitutional:       General: Digna Jessica is not in acute distress.     Appearance: Digna Jessica is well-developed. Digna Jessica is not diaphoretic.   Pulmonary:      Effort: Pulmonary effort is normal.   Skin:     Findings: Erythema present. Rash is vesicular.          Neurological:      Mental Status: Digna Jessica is alert and oriented to person, place, and time.   Psychiatric:         Behavior: Behavior normal.           Assessment & Plan   Diagnoses and all orders for this visit:    1. Herpes zoster without complication (Primary)  -     valACYclovir (Valtrex) 1000 MG tablet; Take 1 tablet by mouth 2 (Two) Times a Day for 7 days.  Dispense: 14 tablet; Refill: 0    2. Viral URI  -     brompheniramine-pseudoephedrine-DM 30-2-10 MG/5ML syrup; Take 5 mL by mouth 4 (Four) Times a Day As Needed for Congestion or Cough.  Dispense: 118 mL; Refill: 0                 The use of a video visit has been reviewed with the patient and verbal informed consent has been obtained. Myself and Digna Jessica participated in this visit. The patient is located in  [place], [place] at home . I am located in [place], [place]. [employer] and [employer]  Video Client were utilized. I spent 12 minutes in the patient's chart for this visit.

## 2024-01-15 ENCOUNTER — OFFICE VISIT (OUTPATIENT)
Dept: OBSTETRICS AND GYNECOLOGY | Facility: CLINIC | Age: 34
End: 2024-01-15
Payer: MEDICAID

## 2024-01-15 VITALS
DIASTOLIC BLOOD PRESSURE: 80 MMHG | SYSTOLIC BLOOD PRESSURE: 122 MMHG | RESPIRATION RATE: 16 BRPM | BODY MASS INDEX: 44.04 KG/M2 | WEIGHT: 277 LBS

## 2024-01-15 DIAGNOSIS — Z30.09 ENCOUNTER FOR COUNSELING REGARDING CONTRACEPTION: ICD-10-CM

## 2024-01-15 DIAGNOSIS — Z01.419 ENCOUNTER FOR WELL WOMAN EXAM WITH ROUTINE GYNECOLOGICAL EXAM: Primary | ICD-10-CM

## 2024-01-15 PROCEDURE — 2014F MENTAL STATUS ASSESS: CPT

## 2024-01-15 PROCEDURE — 99459 PELVIC EXAMINATION: CPT

## 2024-01-15 PROCEDURE — 99395 PREV VISIT EST AGE 18-39: CPT

## 2024-01-15 RX ORDER — RISANKIZUMAB-RZAA 150 MG/ML
INJECTION SUBCUTANEOUS
COMMUNITY
Start: 2023-12-15

## 2024-01-15 NOTE — PROGRESS NOTES
Subjective   Chief Complaint   Patient presents with    Annual Exam     Digna Jessica is a 33 y.o. year old  presenting to be seen for her annual exam.  They prefer they/them pronouns. They are a little tearful and endorses being anxious at being here, but we will go at their pace and if we need to stop we will. They had a blister like spot come up on her left groin a few weeks ago that was tender but it is not still there. Blood work in 2023 showed positive for HSV but they have never had an outbreak.     SEXUAL Hx:  She is currently sexually active.  In the past year there there has been MORE THAN ONE new sexual partner.    Condoms are never used.  She would not like to be screened for STD's at today's exam.  Current birth control method: not using any form of contraception and does not wish to get pregnant.  She is not happy with her current method of contraception and does want to discuss alternative methods of contraception.  MENSTRUAL Hx:  Patient's last menstrual period was 01/15/2024.  In the past 6 months her cycles have been unpredictable irregular.  Her menstrual flow is typically moderately heavy. She uses an ultra tampon and a heavy pad and changes them frequently.   Each month on average there are roughly 3 day(s) of very heavy flow.    Intermenstrual bleeding is present.  Spotting occasionally  Post-coital bleeding is absent.  Dysmenorrhea: moderate and affecting her activities of daily living She has had to call in to work from pain  PMS: moderate and affecting her activities of daily living She also has a diagnosis of borderline personality disorder but is not currently seeing anyone regarding this. She is on bupropion, managed by her PCP.   Her cycles ARE a source of concern for her that she wishes to discuss today.  HEALTH Hx:  She exercises regularly: no (but is planning to start exercising more). They do have an active job.  She wears her seat belt: yes.  She has concerns about  domestic violence: no.  OTHER THINGS SHE WANTS TO DISCUSS TODAY:  No other concerns today.     The following portions of the patient's history were reviewed and updated as appropriate:problem list, current medications, allergies, past family history, past medical history, past social history, and past surgical history.    Social History    Tobacco Use      Smoking status: Every Day        Packs/day: 0.25        Years: 10.00        Additional pack years: 0.00        Total pack years: 2.50        Types: Cigarettes      Smokeless tobacco: Former      Review of Systems   Constitutional:  Negative for appetite change and diaphoresis.   Respiratory: Negative.     Cardiovascular: Negative.    Gastrointestinal:  Negative for abdominal distention, blood in stool, GERD and indigestion.   Endocrine: Negative.    Genitourinary:  Negative for breast discharge, dysuria and hematuria.   Skin: Negative.           Objective   /80   Resp 16   Wt 126 kg (277 lb)   LMP 01/15/2024   BMI 44.04 kg/m²     Physical Exam  Vitals and nursing note reviewed. Exam conducted with a chaperone present.   Constitutional:       Appearance: Normal appearance.   Cardiovascular:      Rate and Rhythm: Normal rate and regular rhythm.      Heart sounds: Normal heart sounds.   Pulmonary:      Effort: Pulmonary effort is normal.      Breath sounds: Normal breath sounds.   Chest:   Breasts:     Right: Normal.      Left: Normal.   Genitourinary:     General: Normal vulva.      Exam position: Lithotomy position.      Vagina: Normal.      Cervix: Normal.      Uterus: Normal.       Adnexa: Right adnexa normal and left adnexa normal.      Rectum: Normal.      Comments: Rectal exam not done but appears normal  Skin:     Findings: Lesion present.   Neurological:      Mental Status: Digna Jessica is alert.   Psychiatric:         Mood and Affect: Mood normal.         Behavior: Behavior normal.         Thought Content: Thought content normal.         Judgment:  Judgment normal.     Scars and lesions along upper thighs and in groin that appear to be hidradenitis suppurativa in various stages of healing.      Past Medical History:   Diagnosis Date    ADHD (attention deficit hyperactivity disorder) 1990    Allergic 1990    Anxiety 2010    Arthritis     Asthma 1990    Depression     Obesity 2010     Past Surgical History:   Procedure Laterality Date    CHOLECYSTECTOMY      CYST REMOVAL         Diagnoses and all orders for this visit:    1. Encounter for well woman exam with routine gynecological exam (Primary)  -     LIQUID-BASED PAP SMEAR WITH HPV GENOTYPING REGARDLESS OF INTERPRETATION ([facility]); Future  -     LIQUID-BASED PAP SMEAR WITH HPV GENOTYPING REGARDLESS OF INTERPRETATION ([facility])    2. Encounter for counseling regarding contraception    Education given regarding options for contraception, including barrier methods, injectable contraception, IUD placement, Nexplanon . Given her age, smoking status, and BMI, we discussed progesterone only methods. They would like to proceed with a Nexplanon so we will get that scheduled today.       No prescription was given or electronically sent at today's visit    The importance of keeping all planned follow-up and taking all medications as prescribed was emphasized.    Today I discussed with Digna the total recommended calcium intake for a premenopausal female is 1000 mg.  Ideally this should be from dietary sources.  I reviewed calcium content in various foods including milk, fortified orange juice and yogurt.  If she cannot get sufficient calcium through dietary means, it is recommended to supplement with either a multivitamin or calcium to reach her daily goal.  I also reviewed the difference in the bioavailability of calcium carbonate and calcium citrate containing supplements and the importance of taking calcium carbonate containing products with food.  Finally, vitamin D's role in calcium absorption was reviewed  and a total daily vitamin D intake of 800 units was recommended.    I discussed with Digna that she may be behind on needed vaccinations for  vaccines up to date .  She may be able to obtain these vaccinations at her local pharmacy OR speak about obtaining them with her primary care.  If she does obtain her vaccines, I have asked Digna to let us know the date each vaccine was obtained so that her medical record could be updated in our system.    No orders of the defined types were placed in this encounter.  Pap and STD testing was done today.  If she does not receive the results of the Pap within 2 weeks  time, she was instructed to call to find out the results.  I explained to Digna that the recommendations for Pap smear interval in a low risk patient's has lengthened to 3 years time.  I encouraged her to be seen yearly for a full physical exam including breast and pelvic exam even during the off years when PAP's will not be performed.           Return in about 1 year (around 1/15/2025) for Annual physical.    Coreen Smith, APRN  January 15, 2024

## 2024-01-17 LAB — REF LAB TEST METHOD: NORMAL

## 2024-02-11 ENCOUNTER — PATIENT MESSAGE (OUTPATIENT)
Dept: OBSTETRICS AND GYNECOLOGY | Facility: CLINIC | Age: 34
End: 2024-02-11
Payer: MEDICAID

## 2024-02-12 RX ORDER — LEVONORGESTREL 1.5 MG/1
1.5 TABLET ORAL ONCE
Qty: 1 TABLET | Refills: 0 | Status: SHIPPED | OUTPATIENT
Start: 2024-02-12 | End: 2024-02-12

## 2024-03-25 ENCOUNTER — TELEMEDICINE (OUTPATIENT)
Dept: FAMILY MEDICINE CLINIC | Facility: TELEHEALTH | Age: 34
End: 2024-03-25
Payer: MEDICAID

## 2024-03-25 DIAGNOSIS — B02.9 HERPES ZOSTER WITHOUT COMPLICATION: Primary | ICD-10-CM

## 2024-03-25 PROBLEM — F43.10 PTSD (POST-TRAUMATIC STRESS DISORDER): Status: ACTIVE | Noted: 2023-10-05

## 2024-03-25 PROBLEM — F41.1 GENERALIZED ANXIETY DISORDER: Status: ACTIVE | Noted: 2023-10-05

## 2024-03-25 PROBLEM — F64.9 GENDER DYSPHORIA: Status: ACTIVE | Noted: 2023-10-05

## 2024-03-25 RX ORDER — TRIAMCINOLONE ACETONIDE 1 MG/G
1 OINTMENT TOPICAL 2 TIMES DAILY
Qty: 30 G | Refills: 0 | Status: SHIPPED | OUTPATIENT
Start: 2024-03-25

## 2024-03-25 RX ORDER — BUPROPION HYDROCHLORIDE 150 MG/1
150 TABLET ORAL EVERY MORNING
COMMUNITY
Start: 2024-01-29 | End: 2025-01-28

## 2024-03-25 RX ORDER — VENLAFAXINE HYDROCHLORIDE 37.5 MG/1
37.5 CAPSULE, EXTENDED RELEASE ORAL DAILY
COMMUNITY
Start: 2024-01-29 | End: 2024-07-27

## 2024-03-25 RX ORDER — VALACYCLOVIR HYDROCHLORIDE 1 G/1
1000 TABLET, FILM COATED ORAL 2 TIMES DAILY
Qty: 14 TABLET | Refills: 0 | Status: SHIPPED | OUTPATIENT
Start: 2024-03-25 | End: 2024-04-01

## 2024-03-25 NOTE — PATIENT INSTRUCTIONS
If symptoms do not improve in 2-4 days, follow up  If it continues to recur, follow up with your primary care provider for suppressive therapy and testing       Clinical References    Shingles  Shingles is an infection. It gives you a painful skin rash and blisters that have fluid in them. Shingles is caused by the same germ (virus) that causes chickenpox.  Shingles only happens in people who:  Have had chickenpox.  Have been given a shot (vaccine) to protect against chickenpox. Shingles is rare in this group.  What are the causes?  This condition is caused by varicella-zoster virus. This is the same germ that causes chickenpox. After a person is exposed to the germ, the germ stays in the body but is not active (dormant).  Shingles develops if the germ becomes active again (is reactivated). This can happen many years after the first exposure to the germ. It is not known what causes this germ to become active again.  What increases the risk?  People who have had chickenpox or received the chickenpox shot are at risk for shingles. This infection is more common in people who:  Are older than 60 years of age.  Have a weakened disease-fighting system (immune system), such as people with:  HIV (human immunodeficiency virus).  AIDS (acquired immunodeficiency syndrome).  Cancer.  Are taking medicines that weaken the immune system, such as organ transplant medicines.  Have a lot of stress.  What are the signs or symptoms?  The first symptoms of shingles may be itching, tingling, or pain in an area on your skin.  A rash will show on your skin a few days or weeks later. This is what usually happens:  The rash is likely to be on one side of your body.  The rash usually has a shape like a belt or a band. Over time, the rash turns into fluid-filled blisters.  The blisters will break open and change into scabs.  The scabs usually dry up in about 2-3 weeks.  You may also have:  A fever.  Chills.  A headache.  A feeling like you may  vomit (nausea).  How is this treated?  The rash may last for several weeks. There is not a specific cure for this condition.  Your doctor may prescribe medicines. Medicines may:  Help with pain.  Help you get better sooner.  Help to prevent long-term problems.  Help with itching (antihistamines).  If the area involved is on your face, you may need to see a specialist. This may be an eye doctor or an ear, nose, and throat (ENT) doctor.  Follow these instructions at home:  Medicines  Take over-the-counter and prescription medicines only as told by your doctor.  Put on an anti-itch cream or numbing cream where you have a rash, blisters, or scabs. Do this as told by your doctor.  Helping with itching and discomfort  Put cold, wet cloths (cold compresses) on the area of the rash or blisters as told by your doctor.  Cool baths can help you feel better. Try adding baking soda or dry oatmeal to the water to lessen itching. Do not bathe in hot water.  Use calamine lotion as told by your doctor.  Blister and rash care  Keep your rash covered with a loose bandage (dressing).  Wear loose clothing that does not rub on your rash.  Wash your hands with soap and water for at least 20 seconds before and after you change your bandage. If you cannot use soap and water, use hand .  Change your bandage as told by your doctor.  Keep your rash and blisters clean. To do this, wash the area with mild soap and cool water as told by your doctor.  Check your rash every day for signs of infection. Check for:  More redness, swelling, or pain.  Fluid or blood.  Warmth.  Pus or a bad smell.  Do not scratch your rash. Do not pick at your blisters. To help you to not scratch:  Keep your fingernails clean and cut short.  Wear gloves or mittens when you sleep, if scratching is a problem.  General instructions  Rest as told by your doctor.  Wash your hands often with soap and water for at least 20 seconds. If you cannot use soap and water, use  hand . Doing this lowers your chance of getting a skin infection.  Your infection can cause chickenpox in people who have never had chickenpox or never got a chickenpox vaccine shot. If you have blisters that did not change into scabs yet, try not to touch other people or be around other people, especially:  Babies.  Pregnant women.  Children who have areas of red, itchy, or rough skin (eczema).  Older people who have organ transplants.  People who have a long-term (chronic) illness, like cancer or AIDS.  Keep all follow-up visits.  How is this prevented?  A vaccine shot is the best way to prevent shingles and protect against shingles problems.  If you have not had a vaccine shot, talk with your doctor about getting it.  Where to find more information  Centers for Disease Control and Prevention: www.cdc.gov  Contact a doctor if:  Your pain does not get better with medicine.  Your pain does not get better after the rash heals.  You have any of these signs of infection around the rash:  More redness, swelling, or pain.  Fluid or blood.  Warmth.  Pus or a bad smell.  You have a fever.  Get help right away if:  The rash is on your face or nose.  You have pain in your face or pain by your eye.  You lose feeling on one side of your face.  You have trouble seeing.  You have ear pain, or you have ringing in your ear.  You have a loss of taste.  Your condition gets worse.  Summary  Shingles gives you a painful skin rash and blisters that have fluid in them.  Shingles is caused by the same germ (virus) that causes chickenpox.  Keep your rash covered with a loose bandage. Wear loose clothing that does not rub on your rash.  If you have blisters that did not change into scabs yet, try not to touch other people or be around people.  This information is not intended to replace advice given to you by your health care provider. Make sure you discuss any questions you have with your health care provider.  Document Revised:  12/13/2021 Document Reviewed: 12/13/2021  [employer] Patient Education © 2023 [employer]     Genital Herpes  Genital herpes is a common sexually transmitted infection (STI) that is caused by a virus. The virus spreads from person to person through contact with a sore, infected saliva, or infected skin. The virus can cause itching, blisters, and sores around the genitals or rectum. During an outbreak of infection, symptoms may last for several days and then go away. However, the virus remains in the body, so more outbreaks may happen in the future. The time between outbreaks varies and can be from months to years.  Genital herpes can affect anyone. It is particularly concerning for pregnant women because the virus can be passed to the baby during delivery. Genital herpes is also a concern for people who have a weak disease-fighting system (immune system).  What are the causes?  This condition is caused by the herpes simplex virus, type 1 or type 2 (HSV-1 or HSV-2). The virus may spread through:  Sexual contact with an infected person, including vaginal, anal, and oral sex.  Contact with a herpes sore.  The skin. This means that you can get herpes from an infected partner even if there are no blisters or sores present. Your partner may not know that he or she is infected.  What increases the risk?  You are more likely to develop this condition if:  You have sex with many partners.  You do not use latex or polyurethane condoms during sex.  What are the signs or symptoms?  Most people do not have symptoms or they have mild symptoms that may be mistaken for other skin problems. Symptoms may include:  Small, red bumps near the genitals, rectum, or mouth. These bumps turn into blisters and then sores.  Flu-like (influenza-like) symptoms, including:  Fever.  Body aches.  Swollen lymph nodes.  Headache.  Painful urination.  Pain and itching in the genital area or rectal area.  Vaginal discharge.  Tingling or shooting pain  in the legs and buttocks.  Generally, symptoms are more severe and last longer during the first (primary) outbreak. Influenza-like symptoms are also more common during the primary outbreak.  How is this diagnosed?  This condition may be diagnosed based on:  A physical exam.  Your medical history.  Blood tests.  A test of a fluid sample (culture) from an open sore.  How is this treated?  There is no cure for this condition, but treatment with antiviral medicines can do the following:  Speed up healing and relieve symptoms.  Help to reduce the spread of the virus to sexual partners.  Limit the chance of future outbreaks, or make future outbreaks shorter.  Lessen symptoms of future outbreaks.  Your health care provider may also recommend over-the-counter medicines to help with pain and itching.  Follow these instructions at home:  If you have an outbreak:  Keep the affected areas dry and clean.  Avoid rubbing or touching blisters and sores. If you do touch blisters or sores:  Wash your hands thoroughly with soap and water for at least 20 seconds. If soap and water are not available, use an alcohol-based hand .  Do not touch your eyes afterward.  Sexual activity  Do not have sexual contact during active outbreaks.  Practice safe sex. Herpes can spread even if your partner does not have blisters or sores. Latex or polyurethane condoms and female condoms may help prevent the spread of the herpes virus.  Managing pain and discomfort  If directed, put ice on the painful area. To do this:  Put ice in a plastic bag.  Place a towel between your skin and the bag.  Leave the ice on for 20 minutes, 2-3 times a day.  Remove the ice if your skin turns bright red. This is very important. If you cannot feel pain, heat, or cold, you have a greater risk of damage to the area.  If told, take a cool sitz bath to help relieve pain or itching. A sitz bath is a water bath that you take while sitting down in water that is deep  enough to cover your hips and buttocks.  General instructions  Take over-the-counter and prescription medicines only as told by your health care provider.  If you were prescribed an antiviral medicine, use it as told by your health care provider. Do not stop using the antiviral even if you start to feel better.  Keep all follow-up visits. This is important.  How is this prevented?  Use condoms. Although you can get genital herpes during sexual contact even with the use of a condom, a condom can provide some protection.  Avoid having multiple sexual partners.  Talk with your sexual partner about any symptoms either of you may have. Also, talk with your partner about any history of STIs.  Do not have sexual contact if you have active symptoms of genital herpes.  Contact a health care provider if:  Your symptoms are not improving with medicine.  Your symptoms return, or you have new symptoms.  You have a fever.  You have abdominal pain.  You have redness, swelling, or pain in your eye.  You notice new sores on other parts of your body.  You have had herpes and you become pregnant or plan to become pregnant.  Get help right away if:  You have symptoms of viral meningitis. This is rare but may happen if the virus spreads to the brain. Symptoms may include:  Severe headache or stiff neck.  Muscle aches.  Nausea and vomiting.  Sensitivity to light.  Summary  Genital herpes is a common sexually transmitted infection (STI) that is caused by the herpes simplex virus, type 1 or type 2 (HSV-1 or HSV-2).  These viruses are most often spread through sexual contact with an infected person.  You are more likely to develop this condition if you have sex with many partners or you do not use condoms during sex.  Most people do not have symptoms or have mild symptoms that may be mistaken for other skin problems. Symptoms occur as outbreaks that may happen months or years apart.  There is no cure for this condition, but treatment with  oral antiviral medicines can reduce symptoms, reduce the chance of spreading the virus to a partner, prevent future outbreaks, or shorten future outbreaks.  This information is not intended to replace advice given to you by your health care provider. Make sure you discuss any questions you have with your health care provider.  Document Revised: 09/22/2022 Document Reviewed: 09/22/2022  [employer] Patient Education © 2023 [employer]

## 2024-03-25 NOTE — PROGRESS NOTES
CHIEF COMPLAINT  Chief Complaint   Patient presents with    Rash         HPI  Digna Jessica is a 33 y.o. adult  presents with complaint of shingles virus on her left hip. She had this several times. She has taken valacyclovir in the past. She does have some fatigue with this.     Review of Systems   Constitutional:  Positive for fatigue. Negative for chills, diaphoresis and fever.   Skin:  Positive for rash.       Past Medical History:   Diagnosis Date    ADHD (attention deficit hyperactivity disorder) 1990    Allergic 1990    Anxiety 2010    Arthritis     Asthma 1990    Depression     Obesity 2010       Family History   Problem Relation Age of Onset    Anxiety disorder Mother     Depression Mother     Diabetes Mother     Heart disease Mother     Mental illness Mother     Anxiety disorder Brother        Social History     Socioeconomic History    Marital status: Single   Tobacco Use    Smoking status: Former     Current packs/day: 0.25     Average packs/day: 0.3 packs/day for 10.0 years (2.5 ttl pk-yrs)     Types: Cigarettes     Passive exposure: Current    Smokeless tobacco: Former    Tobacco comments:     Quit  1 month ago. She is aware of the health risk of smoking    Vaping Use    Vaping status: Some Days    Substances: Nicotine   Substance and Sexual Activity    Alcohol use: Yes     Alcohol/week: 9.0 standard drinks of alcohol     Types: 3 Cans of beer, 3 Shots of liquor, 3 Drinks containing 0.5 oz of alcohol per week    Drug use: Yes     Types: Marijuana    Sexual activity: Yes     Partners: Female, Male     Birth control/protection: Same-sex partner                 LMP 03/25/2024 (Exact Date)   Breastfeeding No     PHYSICAL EXAM  Physical Exam   Constitutional: Digna is oriented to person, place, and time. Digna appears well-developed and well-nourished. Digna does not have a sickly appearance. Digna does not appear ill. No distress.   HENT:   Head: Normocephalic and atraumatic.   Eyes: EOM are normal.    Neck: Neck normal appearance.  Pulmonary/Chest: Effort normal.  No respiratory distress.  Neurological: Digna is alert and oriented to person, place, and time.   Skin: Skin is dry. Rash (vescular lesion with appearance of  herpes lesion.) noted.   Psychiatric: Digna has a normal mood and affect.           Diagnoses and all orders for this visit:    1. Herpes zoster without complication (Primary)    Other orders  -     valACYclovir (Valtrex) 1000 MG tablet; Take 1 tablet by mouth 2 (Two) Times a Day for 7 days.  Dispense: 14 tablet; Refill: 0  -     triamcinolone (KENALOG) 0.1 % ointment; Apply 1 Application topically to the appropriate area as directed 2 (Two) Times a Day.  Dispense: 30 g; Refill: 0    More consistent with genital herpes with one lesion that recurs. She does not have more than one lesion. She reports it was tested and was confirmed as shingles.     The use of a video visit has been reviewed with the patient and verbal informed consent has been obtained. Myself and Digan Jaskaran participated in this visit. The patient is located in [address] [place] [address]. I am located in [place], [place]. Mychart and Twilio were utilized.       Note Disclaimer: At [facility], we believe that sharing information builds trust and better   relationships. You are receiving this note because you recently visited [facility]. It is possible you   will see health information before a provider has talked with you about it. This kind of information can   be easy to misunderstand. To help you fully understand what it means for your health, we urge you to   discuss this note with your provider.    Brenda Horne, ISHAN  03/25/2024  16:28 EDT

## 2024-03-25 NOTE — LETTER
March 25, 2024     Patient: Digna Jessica   YOB: 1990   Date of Visit: 3/25/2024       To Whom It May Concern:    It is my medical opinion that Digna Jessica may return to work on Wednesday 2/27/2024.        Sincerely,    ISHAN Grossman     URGENT CARE VIDEO VISIT PROVIDER    CC: No Recipients

## 2024-07-02 ENCOUNTER — TELEMEDICINE (OUTPATIENT)
Dept: FAMILY MEDICINE CLINIC | Facility: TELEHEALTH | Age: 34
End: 2024-07-02
Payer: MEDICAID

## 2024-07-02 DIAGNOSIS — J06.9 UPPER RESPIRATORY TRACT INFECTION, UNSPECIFIED TYPE: ICD-10-CM

## 2024-07-02 DIAGNOSIS — H10.33 ACUTE BACTERIAL CONJUNCTIVITIS OF BOTH EYES: ICD-10-CM

## 2024-07-02 DIAGNOSIS — J45.41 MODERATE PERSISTENT ASTHMA WITH EXACERBATION: Primary | ICD-10-CM

## 2024-07-02 RX ORDER — BROMPHENIRAMINE MALEATE, PSEUDOEPHEDRINE HYDROCHLORIDE, AND DEXTROMETHORPHAN HYDROBROMIDE 2; 30; 10 MG/5ML; MG/5ML; MG/5ML
5 SYRUP ORAL 4 TIMES DAILY PRN
Qty: 120 ML | Refills: 0 | Status: SHIPPED | OUTPATIENT
Start: 2024-07-02

## 2024-07-02 RX ORDER — AZITHROMYCIN 250 MG/1
TABLET, FILM COATED ORAL
Qty: 6 TABLET | Refills: 0 | Status: SHIPPED | OUTPATIENT
Start: 2024-07-02

## 2024-07-02 RX ORDER — PREDNISONE 10 MG/1
TABLET ORAL
Qty: 21 TABLET | Refills: 0 | Status: SHIPPED | OUTPATIENT
Start: 2024-07-02

## 2024-07-02 RX ORDER — POLYMYXIN B SULFATE AND TRIMETHOPRIM 1; 10000 MG/ML; [USP'U]/ML
1 SOLUTION OPHTHALMIC EVERY 6 HOURS
Qty: 10 ML | Refills: 0 | Status: SHIPPED | OUTPATIENT
Start: 2024-07-02

## 2024-07-02 NOTE — PROGRESS NOTES
You have chosen to receive care through a telehealth visit.  Do you consent to use a video/audio connection for your medical care today? Yes     CHIEF COMPLAINT  No chief complaint on file.        HPI  Digna Jessica is a 33 y.o. adult  presents with complaint of 4 day history of loss of voice, bad dry persistent cough, beginning to wheeze, woke up the past 2 days with both eyes stuck shut, redness of both eyes and itching.      Review of Systems  See HPI    Past Medical History:   Diagnosis Date    ADHD (attention deficit hyperactivity disorder) 1990    Allergic 1990    Anxiety 2010    Arthritis     Asthma 1990    Depression     Obesity 2010       Family History   Problem Relation Age of Onset    Anxiety disorder Mother     Depression Mother     Diabetes Mother     Heart disease Mother     Mental illness Mother     Anxiety disorder Brother        Social History     Socioeconomic History    Marital status: Single   Tobacco Use    Smoking status: Former     Current packs/day: 0.25     Average packs/day: 0.3 packs/day for 10.0 years (2.5 ttl pk-yrs)     Types: Cigarettes     Passive exposure: Current    Smokeless tobacco: Former    Tobacco comments:     Quit  1 month ago. She is aware of the health risk of smoking    Vaping Use    Vaping status: Some Days    Substances: Nicotine   Substance and Sexual Activity    Alcohol use: Yes     Alcohol/week: 9.0 standard drinks of alcohol     Types: 3 Cans of beer, 3 Shots of liquor, 3 Drinks containing 0.5 oz of alcohol per week    Drug use: Yes     Types: Marijuana    Sexual activity: Yes     Partners: Female, Male     Birth control/protection: Same-sex partner       Digna Jessica  reports that Digna has quit smoking. Digna's smoking use included cigarettes. Digna has a 2.5 pack-year smoking history. Digna has been exposed to tobacco smoke. Digna has quit using smokeless tobacco.               There were no vitals taken for this visit.    PHYSICAL EXAM  Physical Exam    Constitutional: Digna is oriented to person, place, and time. Digna appears well-developed and well-nourished. Digna does not have a sickly appearance. Digna does not appear ill.   Hoarse voice   HENT:   Head: Normocephalic and atraumatic.   Pulmonary/Chest: Effort normal.  No respiratory distress (persistent cough during visit).  Neurological: Digna is alert and oriented to person, place, and time.           Diagnoses and all orders for this visit:    1. Moderate persistent asthma with exacerbation (Primary)  -     azithromycin (Zithromax Z-Roni) 250 MG tablet; Take 2 tablets by mouth on day 1, then 1 tablet daily on days 2-5  Dispense: 6 tablet; Refill: 0  -     predniSONE (DELTASONE) 10 MG (21) dose pack; Use as directed on package  Dispense: 21 tablet; Refill: 0    2. Upper respiratory tract infection, unspecified type  -     brompheniramine-pseudoephedrine-DM 30-2-10 MG/5ML syrup; Take 5 mL by mouth 4 (Four) Times a Day As Needed for Congestion, Cough or Allergies.  Dispense: 120 mL; Refill: 0    3. Acute bacterial conjunctivitis of both eyes  -     trimethoprim-polymyxin b (Polytrim) 75899-8.1 UNIT/ML-% ophthalmic solution; Administer 1 drop to both eyes Every 6 (Six) Hours.  Dispense: 10 mL; Refill: 0    --take medications as prescribed; eye drops as prescribed  --wash hands frequently and try not to touch eyes  --increase fluids, rest as needed, tylenol or ibuprofen for pain  --f/u in 5-7 days if no improvement        FOLLOW-UP  As discussed during visit with PCP/[facility] if no improvement or Urgent Care/Emergency Department if worsening of symptoms    Patient verbalizes understanding of medication dosage, comfort measures, instructions for treatment and follow-up.    Kiki Go, ISHAN  07/02/2024  15:31 EDT    The use of a video visit has been reviewed with the patient and verbal informed consent has been obtained. Myself and Digna Jessica participated in this visit. The patient is located in [facility]  [facility] [id number].    I am located in [place], [place]. Mychart and Twilio were utilized. I spent 8 minutes in the patient's chart for this visit.      Note Disclaimer: At [facility], we believe that sharing information builds trust and better   relationships. You are receiving this note because you recently visited [facility]. It is possible you   will see health information before a provider has talked with you about it. This kind of information can   be easy to misunderstand. To help you fully understand what it means for your health, we urge you to   discuss this note with your provider.

## 2024-07-02 NOTE — PATIENT INSTRUCTIONS
Upper Respiratory Infection, Adult  An upper respiratory infection (URI) is a common viral infection of the nose, throat, and upper air passages that lead to the lungs. The most common type of URI is the common cold. URIs usually get better on their own, without medical treatment.  What are the causes?  A URI is caused by a virus. You may catch a virus by:  Breathing in droplets from an infected person's cough or sneeze.  Touching something that has been exposed to the virus (is contaminated) and then touching your mouth, nose, or eyes.  What increases the risk?  You are more likely to get a URI if:  You are very young or very old.  You have close contact with others, such as at work, school, or a health care facility.  You smoke.  You have long-term (chronic) heart or lung disease.  You have a weakened disease-fighting system (immune system).  You have nasal allergies or asthma.  You are experiencing a lot of stress.  You have poor nutrition.  What are the signs or symptoms?  A URI usually involves some of the following symptoms:  Runny or stuffy (congested) nose.  Cough.  Sneezing.  Sore throat.  Headache.  Fatigue.  Fever.  Loss of appetite.  Pain in your forehead, behind your eyes, and over your cheekbones (sinus pain).  Muscle aches.  Redness or irritation of the eyes.  Pressure in the ears or face.  How is this diagnosed?  This condition may be diagnosed based on your medical history and symptoms, and a physical exam. Your health care provider may use a swab to take a mucus sample from your nose (nasal swab). This sample can be tested to determine what virus is causing the illness.  How is this treated?  URIs usually get better on their own within 7-10 days. Medicines cannot cure URIs, but your health care provider may recommend certain medicines to help relieve symptoms, such as:  Over-the-counter cold medicines.  Cough suppressants. Coughing is a type of defense against infection that helps to clear the  respiratory system, so take these medicines only as recommended by your health care provider.  Fever-reducing medicines.  Follow these instructions at home:  Activity  Rest as needed.  If you have a fever, stay home from work or school until your fever is gone or until your health care provider says your URI cannot spread to other people (is no longer contagious). Your health care provider may have you wear a face mask to prevent your infection from spreading.  Relieving symptoms  Gargle with a mixture of salt and water 3-4 times a day or as needed. To make salt water, completely dissolve ½-1 tsp (3-6 g) of salt in 1 cup (237 mL) of warm water.  Use a cool-mist humidifier to add moisture to the air. This can help you breathe more easily.  Eating and drinking    Drink enough fluid to keep your urine pale yellow.  Eat soups and other clear broths.  General instructions    Take over-the-counter and prescription medicines only as told by your health care provider. These include cold medicines, fever reducers, and cough suppressants.  Do not use any products that contain nicotine or tobacco. These products include cigarettes, chewing tobacco, and vaping devices, such as e-cigarettes. If you need help quitting, ask your health care provider.  Stay away from secondhand smoke.  Stay up to date on all immunizations, including the yearly (annual) flu vaccine.  Keep all follow-up visits. This is important.  How to prevent the spread of infection to others  URIs can be contagious. To prevent the infection from spreading:  Wash your hands with soap and water for at least 20 seconds. If soap and water are not available, use hand .  Avoid touching your mouth, face, eyes, or nose.  Cough or sneeze into a tissue or your sleeve or elbow instead of into your hand or into the air.    Contact a health care provider if:  You are getting worse instead of better.  You have a fever or chills.  Your mucus is brown or red.  You have  yellow or brown discharge coming from your nose.  You have pain in your face, especially when you bend forward.  You have swollen neck glands.  You have pain while swallowing.  You have white areas in the back of your throat.  Get help right away if:  You have shortness of breath that gets worse.  You have severe or persistent:  Headache.  Ear pain.  Sinus pain.  Chest pain.  You have chronic lung disease along with any of the following:  Making high-pitched whistling sounds when you breathe, most often when you breathe out (wheezing).  Prolonged cough (more than 14 days).  Coughing up blood.  A change in your usual mucus.  You have a stiff neck.  You have changes in your:  Vision.  Hearing.  Thinking.  Mood.  These symptoms may be an emergency. Get help right away. Call 911.  Do not wait to see if the symptoms will go away.  Do not drive yourself to the hospital.  Summary  An upper respiratory infection (URI) is a common infection of the nose, throat, and upper air passages that lead to the lungs.  A URI is caused by a virus.  URIs usually get better on their own within 7-10 days.  Medicines cannot cure URIs, but your health care provider may recommend certain medicines to help relieve symptoms.  This information is not intended to replace advice given to you by your health care provider. Make sure you discuss any questions you have with your health care provider.  Document Revised: 07/20/2022 Document Reviewed: 07/20/2022  [employer] Patient Education © 2024 [employer]  Bacterial Conjunctivitis, Adult  Bacterial conjunctivitis is an infection of the clear membrane that covers the white part of the eye and the inner surface of the eyelid (conjunctiva). When the blood vessels in the conjunctiva become inflamed, the eye becomes red or pink. The eye often feels irritated or itchy. Bacterial conjunctivitis spreads easily from person to person (is contagious). It also spreads easily from one eye to the other eye.  What  are the causes?  This condition is caused by bacteria. You may get the infection if you come into close contact with:  A person who is infected with the bacteria.  Items that are contaminated with the bacteria, such as a face towel, contact lens solution, or eye makeup.  What increases the risk?  You are more likely to develop this condition if:  You are exposed to other people who have the infection.  You wear contact lenses.  You have a sinus infection.  You have had a recent eye injury or surgery.  You have a weak body defense system (immune system).  You have a medical condition that causes dry eyes.  What are the signs or symptoms?  Symptoms of this condition include:  Thick, yellowish discharge from the eye. This may turn into a crust on the eyelid overnight and cause your eyelids to stick together.  Tearing or watery eyes.  Itchy eyes.  Burning feeling in your eyes.  Eye redness.  Swollen eyelids.  Blurred vision.  How is this diagnosed?  This condition is diagnosed based on your symptoms and medical history. Your health care provider may also take a sample of discharge from your eye to find the cause of your infection.  How is this treated?  This condition may be treated with:  Antibiotic eye drops or ointment to clear the infection more quickly and prevent the spread of infection to others.  Antibiotic medicines taken by mouth (orally) to treat infections that do not respond to drops or ointments or that last longer than 10 days.  Cool, wet cloths (cool compresses) placed on the eyes.  Artificial tears applied 2-6 times a day.  Follow these instructions at home:  Medicines  Take or apply your antibiotic medicine as told by your health care provider. Do not stop using the antibiotic, even if your condition improves, unless directed by your health care provider.  Take or apply over-the-counter and prescription medicines only as told by your health care provider.  Be very careful to avoid touching the edge of  your eyelid with the eye-drop bottle or the ointment tube when you apply medicines to the affected eye. This will keep you from spreading the infection to your other eye or to other people.  Managing discomfort  Gently wipe away any drainage from your eye with a warm, wet washcloth or a cotton ball.  Apply a clean, cool compress to your eye for 10-20 minutes, 3-4 times a day.  General instructions  Do not wear contact lenses until the inflammation is gone and your health care provider says it is safe to wear them again. Ask your health care provider how to sterilize or replace your contact lenses before you use them again. Wear glasses until you can resume wearing contact lenses.  Avoid wearing eye makeup until the inflammation is gone. Throw away any old eye cosmetics that may be contaminated.  Change or wash your pillowcase every day.  Do not share towels or washcloths. This may spread the infection.  Wash your hands often with soap and water for at least 20 seconds and especially before touching your face or eyes. Use paper towels to dry your hands.  Avoid touching or rubbing your eyes.  Do not drive or use heavy machinery if your vision is blurred.  Contact a health care provider if:  You have a fever.  Your symptoms do not get better after 10 days.  Get help right away if:  You have a fever and your symptoms suddenly get worse.  You have severe pain when you move your eye.  You have facial pain, redness, or swelling.  You have a sudden loss of vision.  Summary  Bacterial conjunctivitis is an infection of the clear membrane that covers the white part of the eye and the inner surface of the eyelid (conjunctiva).  Bacterial conjunctivitis spreads easily from eye to eye and from person to person (is contagious).  Wash your hands often with soap and water for at least 20 seconds and especially before touching your face or eyes. Use paper towels to dry your hands.  Take or apply your antibiotic medicine as told by  your health care provider. Do not stop using the antibiotic even if your condition improves.  Contact a health care provider if you have a fever or if your symptoms do not get better after 10 days. Get help right away if you have a sudden loss of vision.  This information is not intended to replace advice given to you by your health care provider. Make sure you discuss any questions you have with your health care provider.  Document Revised: 03/30/2022 Document Reviewed: 03/30/2022  [employer] Patient Education © 2024 [employer]
